# Patient Record
Sex: MALE | URBAN - METROPOLITAN AREA
[De-identification: names, ages, dates, MRNs, and addresses within clinical notes are randomized per-mention and may not be internally consistent; named-entity substitution may affect disease eponyms.]

---

## 2017-07-10 ENCOUNTER — IMPORTED ENCOUNTER (OUTPATIENT)
Dept: URBAN - METROPOLITAN AREA CLINIC 9 | Facility: CLINIC | Age: 42
End: 2017-07-10

## 2017-07-26 ENCOUNTER — IMPORTED ENCOUNTER (OUTPATIENT)
Dept: URBAN - METROPOLITAN AREA CLINIC 9 | Facility: CLINIC | Age: 42
End: 2017-07-26

## 2017-07-27 ENCOUNTER — IMPORTED ENCOUNTER (OUTPATIENT)
Dept: URBAN - METROPOLITAN AREA CLINIC 9 | Facility: CLINIC | Age: 42
End: 2017-07-27

## 2017-07-28 ENCOUNTER — IMPORTED ENCOUNTER (OUTPATIENT)
Dept: URBAN - METROPOLITAN AREA CLINIC 9 | Facility: CLINIC | Age: 42
End: 2017-07-28

## 2017-08-04 ENCOUNTER — IMPORTED ENCOUNTER (OUTPATIENT)
Dept: URBAN - METROPOLITAN AREA CLINIC 9 | Facility: CLINIC | Age: 42
End: 2017-08-04

## 2017-09-05 ENCOUNTER — IMPORTED ENCOUNTER (OUTPATIENT)
Dept: URBAN - METROPOLITAN AREA CLINIC 9 | Facility: CLINIC | Age: 42
End: 2017-09-05

## 2017-11-14 ENCOUNTER — IMPORTED ENCOUNTER (OUTPATIENT)
Dept: URBAN - METROPOLITAN AREA CLINIC 9 | Facility: CLINIC | Age: 42
End: 2017-11-14

## 2018-02-14 ENCOUNTER — IMPORTED ENCOUNTER (OUTPATIENT)
Dept: URBAN - METROPOLITAN AREA CLINIC 9 | Facility: CLINIC | Age: 43
End: 2018-02-14

## 2019-08-22 NOTE — PATIENT DISCUSSION
Recommend Mini lower lift, +/- platysmaplasty, pre/post-tragel, SMAS to mid face(discussed risks and benefits of sx. ..).  PATIENT TO STOP SMOKING OR USING LOZENGES.

## 2019-08-22 NOTE — PATIENT DISCUSSION
Recommend 50 units of Botox. Patient elects Botox injection. 50units discarded, 50 units used. (discussed risks and benefits of BOTOX. ..). [id number] exp 11/2021.

## 2019-08-27 NOTE — PATIENT DISCUSSION
Recommend 1cc's of Jenny volbella:  LOT# [id number] Exp:10-1-20 (discussed risks and benefits. ..). Patient desires to proceed today.  Consent form signed.  Photos taken.  Hurricaine applied prior to procedure.   Follow up prn. Discussed will likely need additional syringe on follow up.

## 2019-09-05 NOTE — PROCEDURE NOTE: CLINICAL
PROCEDURE NOTE: Restylane Injection 1 syringe Restylane Defyne LOT# [id number] EXP: 11/0/2020 used in [employer]. Arielle Holland PROCEDURE NOTE: Botox Injection, Face Prior to treatment, the risks/benefits/alternatives were discussed. The patient wished to proceed with procedure. Refer to template for location and amounts of injections. Botox was injected at each site without complications. Lot #:[id number]. Expiration Date: 11/2021 Total Units Used: 10. . Total Units Wasted: 90. Patient tolerated procedure well. There were no complications. Post procedure instructions given. Arielle Holland

## 2019-09-05 NOTE — PROCEDURE NOTE: CLINICAL
PROCEDURE NOTE: Restylane Injection 1 syringe Restylane Defyne LOT# [id number] EXP: 11/0/2020 used in [employer]. Arielle Holland PROCEDURE NOTE: Botox Injection, Face Prior to treatment, the risks/benefits/alternatives were discussed. The patient wished to proceed with procedure. Refer to template for location and amounts of injections. Botox was injected at each site without complications. Lot #:[id number]. Expiration Date: 11/2021 Total Units Used: 10. . Total Units Wasted: 90. Patient tolerated procedure well. There were no complications. Post procedure instructions given. Arielle Holland

## 2019-09-05 NOTE — PATIENT DISCUSSION
Discussed Botox touch up for forehead and crows feet. Consent signed. Photos taken. Pt desires to proceed with Botox today. 10 units used. Follow-up prn.

## 2019-09-05 NOTE — PATIENT DISCUSSION
"Recommended Restylane Defyne today for a more ""softer appearance"" (discussed risks and benefits). May be a little more bruised this visit and see bunch of little bumps around mouth. Patient desires to proceed with 1 syringe of Restylane Defyne (BUM#[id number] EXP: 11/30/2020). Consent signed. Photos taken. Follow up prn. Discussed Sente. "

## 2019-10-24 NOTE — PATIENT DISCUSSION
Recommended 0.5 cc e of Vobella XC to soften lines (discussed risks and benefits). Patient desires to proceed with 1 syringe of Vobella XC today. [facility]#[id number] EXP:06/2020 Consent signed. Photos taken. Follow up prn. Discussed Sente.

## 2019-10-24 NOTE — PATIENT DISCUSSION
Recommend 1 syringe of Voluma XC split between both sides. Pt elects to have 1 syringe today. [facility]#[id number] EXP: 11/2020 Injected by Arpita Jordan PA-C. Photos taken.

## 2019-10-24 NOTE — PROCEDURE NOTE: CLINICAL
PROCEDURE NOTE: Botox Injection, Face Prior to treatment, the risks/benefits/alternatives were discussed. The patient wished to proceed with procedure. Refer to template for location and amounts of injections. Botox was injected at each site without complications. 1 SYRINGE of VOLUMA XC [id number] EXP: 11/2020. 1 Syring of Vobella xc  [id number] EXP:06/2020. 8 units of Botox 92 units dis Patient tolerated procedure well. There were no complications. Post procedure instructions given. RTY#[id number] xp:01/2022.

## 2019-10-24 NOTE — PATIENT DISCUSSION
Recommend using . 8 units of Botox. in forehead. Pt KAYLEEN#[id number] xp 01/2022. Photos taken. Injection by [facility] PA-.

## 2021-05-06 NOTE — PATIENT DISCUSSION
Recommend 1-2 cc's of Juvederm  Voluma (discussed risks and benefits. ..).   Lot#: [id number]. Exp Date: 02/28/2022.  Will start with one syringe today and will place another one on f/u.

## 2021-05-06 NOTE — PATIENT DISCUSSION
Recommend 1cc's of Vollure (discussed risks and benefits. ..).  [id number] Expiration date: 10/22/2022.

## 2021-05-06 NOTE — PATIENT DISCUSSION
Discussed will likely need additional 1-2 syringes on f/u. Will consider more Vollure or switching to [facility].

## 2021-05-06 NOTE — PROCEDURE NOTE: CLINICAL
PROCEDURE NOTE: Juvederm Voluma OU. Diagnosis: Rhytids, Crowsfeet/Glabella/Forehead. Risks, benefits and alternatives were discussed. Patient desires to proceed with filler today. Involved area was thoroughly prepped using alcohol wipes. One  1ml syringe of product was used today to involved area. See chart diagram/plan notes for details. Patient tolerated the procedure well and left in good condition. Lot#: [id number]. Exp Date: 02/28/2022. Liza Floris PROCEDURE NOTE: Juvederm OU. Diagnosis: Rhytids, Crowsfeet/Glabella/Forehead. Prior to the treatment, the risks/benefits/alternatives were discussed. The patient wished to proceed with the procedure. Lot #: [id number] Expiration date: 10/22/2022 Total ml used: * Refer to template for location and number of injections. Patient tolerated the procedure well. There were no complications, post procedure instructions were given. Liza Floris PROCEDURE NOTE: Botox Injection, Face OU. Diagnosis: Rhytids, Crowsfeet/Glabella/Forehead. Prior to treatment, the risks/benefits/alternatives were discussed. The patient wished to proceed with procedure. Refer to template for location and amounts of injections. Botox was injected at each site without complications. Lot #:[id number]  null. Expiration Date: 08/2023 . EXP. Total Units Used: 64. Inventory Id: null. Total Units Wasted: 36. Patient tolerated procedure well. There were no complications. Post procedure instructions given. Liza Floris

## 2021-05-06 NOTE — PATIENT DISCUSSION
Recommend 64 units of Botox. Patient elects Botox injection. 36units discarded, 64 units used. (discussed risks and benefits of BOTOX. ..).   Lot #:[id number]  . Expiration Date: 08/2023.

## 2021-05-06 NOTE — PROCEDURE NOTE: CLINICAL
PROCEDURE NOTE: Juvederm Voluma OU. Diagnosis: Rhytids, Crowsfeet/Glabella/Forehead. Risks, benefits and alternatives were discussed. Patient desires to proceed with filler today. Involved area was thoroughly prepped using alcohol wipes. One  1ml syringe of product was used today to involved area. See chart diagram/plan notes for details. Patient tolerated the procedure well and left in good condition. Lot#: [id number]. Exp Date: 02/28/2022. Liza Blakely PROCEDURE NOTE: Juvederm OU. Diagnosis: Rhytids, Crowsfeet/Glabella/Forehead. Prior to the treatment, the risks/benefits/alternatives were discussed. The patient wished to proceed with the procedure. Lot #: [id number] Expiration date: 10/22/2022 Total ml used: * Refer to template for location and number of injections. Patient tolerated the procedure well. There were no complications, post procedure instructions were given. Liza Blakely PROCEDURE NOTE: Botox Injection, Face OU. Diagnosis: Rhytids, Crowsfeet/Glabella/Forehead. Prior to treatment, the risks/benefits/alternatives were discussed. The patient wished to proceed with procedure. Refer to template for location and amounts of injections. Botox was injected at each site without complications. Lot #:[id number]  null. Expiration Date: 08/2023 . EXP. Total Units Used: 64. Inventory Id: null. Total Units Wasted: 36. Patient tolerated procedure well. There were no complications. Post procedure instructions given. Liza Brooks

## 2021-05-06 NOTE — PROCEDURE NOTE: CLINICAL
PROCEDURE NOTE: Juvederm Voluma OU. Diagnosis: Rhytids, Crowsfeet/Glabella/Forehead. Risks, benefits and alternatives were discussed. Patient desires to proceed with filler today. Involved area was thoroughly prepped using alcohol wipes. One  1ml syringe of product was used today to involved area. See chart diagram/plan notes for details. Patient tolerated the procedure well and left in good condition. Lot#: [id number]. Exp Date: 02/28/2022. Liza [place] PROCEDURE NOTE: Juvederm OU. Diagnosis: Rhytids, Crowsfeet/Glabella/Forehead. Prior to the treatment, the risks/benefits/alternatives were discussed. The patient wished to proceed with the procedure. Lot #: [id number] Expiration date: 10/22/2022 Total ml used: * Refer to template for location and number of injections. Patient tolerated the procedure well. There were no complications, post procedure instructions were given. Liza Brooks PROCEDURE NOTE: Botox Injection, Face OU. Diagnosis: Rhytids, Crowsfeet/Glabella/Forehead. Prior to treatment, the risks/benefits/alternatives were discussed. The patient wished to proceed with procedure. Refer to template for location and amounts of injections. Botox was injected at each site without complications. Lot #:[id number]  null. Expiration Date: 08/2023 . EXP. Total Units Used: 64. Inventory Id: null. Total Units Wasted: 36. Patient tolerated procedure well. There were no complications. Post procedure instructions given. Liza Brooks

## 2021-06-03 NOTE — PATIENT DISCUSSION
Recommend 1cc's of Juvederm Volbella (discussed risks and benefits. ..). Lot#: [id number]. Exp Date: 07/31/2022.

## 2021-06-03 NOTE — PROCEDURE NOTE: CLINICAL
PROCEDURE NOTE: Juvederm Volbella OU. Diagnosis: Rhytids, Volume Loss to Cheeks. Risks, benefits and alternatives were discussed. Patient desires to proceed with filler today. Involved area was thoroughly prepped using alcohol wipes. One two 1ml or . 5ml syringe(s) was/were used today to involved area. See chart diagram/plan notes for details. Patient tolerated the procedure well and left in good condition. Lot#: [id number]. Exp Date: 07/31/2022. Kacey Rivas

## 2021-10-16 ASSESSMENT — VISUAL ACUITY
OD_SC: CF 2FT SN
OS_SC: CF 2FT SN
OD_CC: 20/20 SN
OS_SC: 20/15 SN
OS_SC: 20/15 SN
OD_CC: 20/20 SN
OS_CC: 20/25 SN
OD_SC: CF 2FT SN
OS_SC: 20/20 SN
OS_CC: 20/20 SN
OD_CC: 20/20 SN
OS_CC: 20/20 SN
OD_SC: 20/15 SN
OS_CC: 20/15 SN
OD_CC: 20/20 SN
OD_SC: 20/20 SN
OD_CC: 20/20 SN
OD_CC: 20/25 SN
OS_CC: 20/20 SN
OS_CC: 20/20 SN
OD_SC: 20/25 SN
OD_SC: 20/20 SN
OD_CC: 20/15 SN
OS_SC: CF 2FT SN
OS_SC: 20/25 SN
OD_SC: 20/20 SN
OS_CC: 20/25 SN
OS_SC: 20/20 SN

## 2021-10-16 ASSESSMENT — KERATOMETRY
OD_AXISANGLE2_DEGREES: 85
OS_AXISANGLE2_DEGREES: 100
OD_AXISANGLE_DEGREES: 96
OS_K1POWER_DIOPTERS: 37.25
OD_K2POWER_DIOPTERS: 41
OD_AXISANGLE2_DEGREES: 174
OD_K2POWER_DIOPTERS: 41
OD_K2POWER_DIOPTERS: 37.25
OS_K2POWER_DIOPTERS: 37.5
OS_K1POWER_DIOPTERS: 37.25
OD_AXISANGLE2_DEGREES: 90
OS_K2POWER_DIOPTERS: 41.75
OS_K2POWER_DIOPTERS: 37.5
OS_K2POWER_DIOPTERS: 41.5
OS_AXISANGLE2_DEGREES: 95
OS_AXISANGLE2_DEGREES: 67
OD_K1POWER_DIOPTERS: 37
OD_K2POWER_DIOPTERS: 37.25
OD_AXISANGLE2_DEGREES: 90
OS_AXISANGLE_DEGREES: 10
OS_AXISANGLE2_DEGREES: 87
OS_K1POWER_DIOPTERS: 37.25
OD_AXISANGLE_DEGREES: 175
OS_AXISANGLE_DEGREES: 163
OD_K1POWER_DIOPTERS: 37.25
OS_K2POWER_DIOPTERS: 37.5
OS_K1POWER_DIOPTERS: 41
OS_K1POWER_DIOPTERS: 41.25
OS_AXISANGLE_DEGREES: 157
OD_AXISANGLE2_DEGREES: 6
OS_AXISANGLE_DEGREES: 5
OS_AXISANGLE_DEGREES: 177
OD_AXISANGLE_DEGREES: 180
OD_AXISANGLE_DEGREES: 84
OD_K1POWER_DIOPTERS: 41
OD_AXISANGLE_DEGREES: 180
OD_K2POWER_DIOPTERS: 37.25
OS_AXISANGLE2_DEGREES: 73
OD_K1POWER_DIOPTERS: 37
OD_K1POWER_DIOPTERS: 40.75

## 2021-10-16 ASSESSMENT — TONOMETRY
OS_IOP_MMHG: 17
OD_IOP_MMHG: 16

## 2021-10-16 ASSESSMENT — PACHYMETRY
OD_CT_UM: 610.0
OS_CT_UM: 615.0

## 2021-10-21 NOTE — PROCEDURE NOTE: CLINICAL
PROCEDURE NOTE: Juvederm Voluma OU. Diagnosis: Rhytids, Crowsfeet/Glabella/Forehead. Risks, benefits and alternatives were discussed. Patient desires to proceed with filler today. Involved area was thoroughly prepped using alcohol wipes. One  1ml syringe  of product was used today to involved area. See chart diagram/plan notes for details. Patient tolerated the procedure well and left in good condition. [facility]#[id number] EXP: 08/25/2022. PROCEDURE NOTE: Botox Injection, Face OU. Diagnosis: Rhytids, Crowsfeet/Glabella/Forehead. Prior to treatment, the risks/benefits/alternatives were discussed. The patient wished to proceed with procedure. Refer to template for location and amounts of injections. Botox was injected at each site without complications. Lot # [id number] Exp: 01/2024  Total Units Used: 64. Inventory Id: null. Total Units Wasted: 36. Patient tolerated procedure well. There were no complications. Post procedure instructions given. Jerrell Caceres

## 2021-10-21 NOTE — PATIENT DISCUSSION
Recommend additional 1cc  of Juvederm Voluma to cheeks today (discussed risks and benefits. ..) [facility]#[id number] EXP: 08/25/2022.

## 2021-10-21 NOTE — PATIENT DISCUSSION
Recommend 64 units of Botox. Patient elects Botox injection. 36units discarded, 64 units used. (discussed risks and benefits of BOTOX. .. ).

## 2021-10-21 NOTE — PROCEDURE NOTE: CLINICAL
PROCEDURE NOTE: Juvederm Voluma OU. Diagnosis: Rhytids, Crowsfeet/Glabella/Forehead. Risks, benefits and alternatives were discussed. Patient desires to proceed with filler today. Involved area was thoroughly prepped using alcohol wipes. One  1ml syringe  of product was used today to involved area. See chart diagram/plan notes for details. Patient tolerated the procedure well and left in good condition. ALAN#[id number] EXP: 08/25/2022. PROCEDURE NOTE: Botox Injection, Face OU. Diagnosis: Rhytids, Crowsfeet/Glabella/Forehead. Prior to treatment, the risks/benefits/alternatives were discussed. The patient wished to proceed with procedure. Refer to template for location and amounts of injections. Botox was injected at each site without complications. Lot # [id number] Exp: 01/2024  Total Units Used: 64. Inventory Id: null. Total Units Wasted: 36. Patient tolerated procedure well. There were no complications. Post procedure instructions given. Jerrell Caceres

## 2021-11-11 NOTE — PATIENT DISCUSSION
Discussed filler placement by Dr. Prosper Ferrera to address deep glabella lines and risks involved.

## 2021-11-11 NOTE — PATIENT DISCUSSION
Recommended 45 units of Botox to glabella, crows feet, forehead.  Lot #: [id number]  Expiration Date: 01/2024.

## 2021-11-11 NOTE — PROCEDURE NOTE: CLINICAL
PROCEDURE NOTE: Botox Injection, Face OU. Diagnosis: Rhytids, Crowsfeet/Glabella/Forehead. Prior to treatment, the risks/benefits/alternatives were discussed. The patient wished to proceed with procedure. Refer to template for location and amounts of injections. Botox was injected at each site without complications. Lot #: [id number]  Expiration Date: 01/2024. Total Units Used: 45. Inventory Id: null. Total Units Wasted: 55. Patient tolerated procedure well. There were no complications. Post procedure instructions given. Kp Murray

## 2022-07-07 RX ORDER — FLUTICASONE PROPIONATE 50 MCG
SPRAY, SUSPENSION (ML) NASAL
COMMUNITY

## 2022-07-07 RX ORDER — CETIRIZINE HYDROCHLORIDE 10 MG/1
TABLET ORAL
COMMUNITY

## 2022-07-07 RX ORDER — OMEPRAZOLE 40 MG/1
1 CAPSULE, DELAYED RELEASE ORAL
COMMUNITY

## 2022-11-03 NOTE — PROCEDURE NOTE: CLINICAL
PROCEDURE NOTE: Botox Injection, Face Full Face. Diagnosis: Rhytids, Crowsfeet/Glabella/Forehead. Prior to treatment, the risks/benefits/alternatives were discussed. The patient wished to proceed with procedure. Refer to template for location and amounts of injections. Botox was injected at each site without complications. Lot #: [id number] Expiration Date:  04/2024. Total Units Used: 64. Inventory Id: null. Total Units Wasted: 36. Patient tolerated procedure well. There were no complications. Post procedure instructions given. Mac Callejas

## 2022-11-07 NOTE — PROCEDURE NOTE: CLINICAL
PROCEDURE NOTE: Juvederm Voluma Full Face. Diagnosis: Rhytids, Volume Loss to Cheeks. Risks, benefits and alternatives were discussed. Patient desires to proceed with filler today. Involved area was thoroughly prepped using alcohol wipes. Two 1ml syringe(s) of product were used today to involved area. See chart diagram/plan notes for details. Patient tolerated the procedure well and left in good condition. Lot#: [id number]. Exp Date: 08/04/2023. Ghanshyam De La Garza PROCEDURE NOTE: Revance Resilient Hyaluronic Acid 2 Lips. Diagnosis: Rhytids, Volume Loss to Cheeks. Prior to the treatment, the risks/benefits/alternatives were discussed. The patient wished to proceed with the procedure. Lot #: [id number]. Expiration date: 09/20/2024. Total ml used: 1. Refer to template for location and number of injections. Patient tolerated the procedure well. There were no complications, post procedure instructions were given. Ghanshyam De La Garza

## 2022-11-07 NOTE — PROCEDURE NOTE: CLINICAL
PROCEDURE NOTE: Juvederm Voluma Full Face. Diagnosis: Rhytids, Volume Loss to Cheeks. Risks, benefits and alternatives were discussed. Patient desires to proceed with filler today. Involved area was thoroughly prepped using alcohol wipes. Two 1ml syringe(s) of product were used today to involved area. See chart diagram/plan notes for details. Patient tolerated the procedure well and left in good condition. Lot#: [id number]. Exp Date: 08/04/2023. Ghanshyam De La Garza PROCEDURE NOTE: Revance Resilient Hyaluronic Acid 2 Lips. Diagnosis: Rhytids, Volume Loss to Cheeks. Prior to the treatment, the risks/benefits/alternatives were discussed. The patient wished to proceed with the procedure. Lot #: [id number]. Expiration date: 09/20/2024. Total ml used: 1. Refer to template for location and number of injections. Patient tolerated the procedure well. There were no complications, post procedure instructions were given. Ghanshyam De La Garza

## 2022-11-07 NOTE — PATIENT DISCUSSION
Recommend 1cc's of RHA 2 to lips and fine lip lines  (discussed risks and benefits. ..).   Lot #: [id number]. Expiration date: 09/20/2024.

## 2022-11-07 NOTE — PATIENT DISCUSSION
Recommend 2cc's of Juvederm Voluma (discussed risks and benefits. ..).   Lot#: [id number]. Exp Date: 08/04/2023.
